# Patient Record
Sex: FEMALE | Race: ASIAN | Employment: OTHER | URBAN - METROPOLITAN AREA
[De-identification: names, ages, dates, MRNs, and addresses within clinical notes are randomized per-mention and may not be internally consistent; named-entity substitution may affect disease eponyms.]

---

## 2018-12-18 NOTE — PROGRESS NOTES
Annual Wellness Visit for 65 years and older    HPI  This 68 year old female presents as a new patient  Carole Weaver who presents for an annual wellness visit.    Other issues patient wants to be addressed today:  Chief Complaint   Patient presents with     Wellness Visit     Medication Reconciliation     completed, pt not taking anything      Patient Active Problem List   Diagnosis     Essential hypertension, benign     Mild major depression (H)     History reviewed. No pertinent past medical history.    History reviewed. No pertinent family history.  Problem List, Family History and past Medical History reviewed and  unchanged/updated.    Nursing Notes:   India Wise RN  12/20/2018  3:43 PM  Signed  Medicare Wellness Visit  Health Risk Assessment           Health Risk Assessment / Review of Systems     Constitutional: Any fevers or night sweats? No     Eyes:  Vision problems   No- last eye exam one month ago, received new eye glasses.    Hearing Do you feel you have hearing loss?  No     Cardiovascular: Any chest pain, fast or irregular heart beat, calf pain with walking?     No           Respiratory:   Any breathing problems or cough?   No- about 2 days ago has developed rhinorrhea. Otherwise no voiced concerns.    Gastrointestinal: Any stomach or stool problems?   No      Genitourinary: Do you have difficulty controlling urination?   No a problem. Would like to report she does wake up once a night to urinate.    Muscles and Joints: Any joint stiffness or soreness?   No     Skin: Any concerning lesions or moles?   No     Nervous System: Any loss of strength or feeling, numbness or tingling, shaking, dizziness, or headache?  No     Mental Health: Any depression, anxiety or problems sleeping?    No     Cognition: Do you have any problems with your memory?  No            Medical Care     What other specialists or organizations are involved in your medical care?  NONE  Patient Care Team       Relationship  Specialty Notifications Start End    Carole Weaver DO PCP - General   12/14/18     Phone: [phone] Fax: [phone]         [facility] [place] [address]          Have you been to the ER or overnight in the hospital in the last year?  No          Social History / Home Safety       Marital Status:-  passed away in 1988  Who lives in your household? Lives with her son and daughter in law, 4 grandchildren and 2 sons.    Do you feel threatened or controlled by a partner, ex-partner or anyone in your life? No     Has anyone hurt you physically, for example by pushing, hitting, slapping or kicking you   or forcing you to have sex? No          Does your home have any of the following safety concerns; loose rugs in the hallway,  bathrooms with no grab bars by the tub or toilet, stairs with no handrails or poorly lit areas?   YES - no grab bars in bathroom but not necessary and of concern currently.    Do you need help with dressing yourself, bathing, eating or getting around your home?  No     Do you need help with the phone, transportation, shopping, preparing meals, housework, laundry, medications or managing money?No       Risk Behaviors and Healthy Habits     History   Smoking Status     Never Smoker   Smokeless Tobacco     Not on file     How many servings of fruits and vegetables do you eat a day? 4-5 servings    Exercise: walking on treadmill 7 times a week x 1 hour.     Do you frequently drive without a seatbelt? No     Do you use tobacco?  No     Do you use any other drugs? No         Do you use alcohol?No      Frailty Assessment            Have you lost 10 or more pounds unintentionally in the previous year? No     How difficult is walking from one room to the other on the same level? not     Is it difficult to lift or carry something as heavy as 10 pounds? not      Compared with most (men/women) your age, would you say  that you are more active, less active, or about the same?  "more        FALL RISK ASSESSMENT 12/20/2018   Fallen 2 or more times in the past year? No   Any fall with injury in the past year? No     Timed up and go test- 8 seconds    Advance Directives:   Discussed with patient and family as appropriate. Has patient  completed advance directives and/or a living will? No- blank copy given to patient to bring home and complete with her children.    India Wise RN    [facility], [place], [place]  12/20/2018  4:54 PM  Signed  Due to patient being non-English speaking/uses sign language, an  was used for this visit. Only for face-to-face interpretation by an external agency, date and length of interpretation can be found on the scanned worksheet.     name: jorge law  Agency: [employer]  Language: Tricia   Telephone number: [phone]  Type of interpretation: Face-to-face, spoken        FOR WOMEN  What year did you stop having periods? 2003  Any vaginal bleeding in the last year? No  Have you ever had an abnormal Pap smear? No    EVALUATION OF COGNITIVE FUNCTION  Mood/affect:Normal  Appearance:Normal  Family member/caregiver input: N/A    PHQ-2 Score:   PHQ2 score 12/20/18 = 0  PHQ-2 ( 1999 Pfizer) 2/11/2014 7/18/2013   Q1: Little interest or pleasure in doing things 3 0   Q2: Feeling down, depressed or hopeless 3 0   PHQ-2 Score 6 0     PHQ-9 Score:   No flowsheet data found.    Advance Directives: Discussed with patient and family as appropriate. Has patient  completed advance directives and/or a living will? No, given  Immunization History   Administered Date(s) Administered     Influenza (High Dose) 3 valent vaccine 12/20/2018     Influenza (IIV3) PF 09/24/2012     Influenza Vaccine, 3 YRS +, IM (QUADRIVALENT W/PRESERVATIVES) 02/11/2014     Pneumo Conj 13-V (2010&after) 12/20/2018     TDAP Vaccine (Boostrix) 02/11/2014     Reviewed Immunization Record Today  Physical Exam    Vitals: BP (!) 184/98   Pulse 74   Temp 98.3  F (36.8  C) (Oral)   Ht 1.448 m (4' 9\")   " Wt 81.2 kg (179 lb)   SpO2 97%   BMI 38.74 kg/m    BMI= Body mass index is 38.74 kg/m .  EXAM:  GENERAL: healthy, alert and in no distress  EYES: Eyes grossly normal to inspection, extraocular movements intact, PERRL  HENT: ear canals clear with pearly grey TMs without bulging or erythema, moist mucous membranes with good dentition, no ulcers or lesions noted in oropharynx  NECK: no cervical adenopathy or other masses, thyroid without enlargement or nodules  RESP: lungs clear to auscultation - no wheezing or crackles, good air movement throughout  CV: regular rates and rhythm, normal S1 and S2 without murmur  ABDOMEN: soft, non-tender, no hepatosplenomegaly, normal active bowel sounds  MS: no gross extremity deformities noted, no peripheral edema  SKIN: no suspicious lesions or rashes  NEURO: moving all extremities equally and without difficulty, grossly normal sensory exam, intact mentation, clear coherent speech, symmetric reflexes   PSYCH: Alert and oriented times 3, able to articulate logical thoughts, no tangential thoughts, no hallucinations or delusions, appropriate affect    The 10-year ASCVD risk score (Vesna RAFAEL Jr., et al., 2013) is: 15.3%    Values used to calculate the score:      Age: 68 years      Sex: Female      Is Non- : No      Diabetic: No      Tobacco smoker: No      Systolic Blood Pressure: 184 mmHg      Is BP treated: No      HDL Cholesterol: 53 mg/dL      Total Cholesterol: 205 mg/dL    Assessment and Plan:  1. Medicare annual wellness visit, initial  Blood work significant for elevated LDL (135), calculated ASCVD risk 15.3%. Discuss initiation of statin and aspirin at follow up visit.     Lipids Screening- accepts  Mammogram- declines  Colon Cancer screening or offer iFOB Test- declines  Hep C screen- accepts  Zoster- encouraged to receive at local pharmacy  PCV13- accepted  Flu- accepts  DEXA- declines    - CBC with Plt ([facility])  - Basic Metabolic Panel ([facility])  -  Results < 1 hr  - Lipid Panel ([facility])  - Hepatitis C Antibody ([facility])  - Hemoglobin A1c ([facility])  - ADMIN VACCINE, INITIAL  - ADMIN VACCINE, EACH ADDITIONAL    2. Essential hypertension, benign  BP uncontrolled today. Hasn't been on anti-hypertensives since 2014, went off of them on her own. She is asymptomatic. Patient not interested in treatment despite discussion of risks of MI/CVA. Plan to follow up for BP after her trip to [place].     3. Mild major depression (H)  Mood stable, PHQ2 score =0 today. Not on mood stabilizing medication.     4. Immunization due  Flu and PCV given.   - ADMIN VACCINE, INITIAL  - ADMIN VACCINE, EACH ADDITIONAL      F/up 4 weeks to discuss initiation of BP med (if BP remains elevated), starting aspirin and statin.     Options for treatment and follow-up care were reviewed with the Hemal Becerra  and/or guardian engaged in the decision making process and verbalized  understanding of the options discussed and agreed with the final plan.    Carole Weaver, DO

## 2018-12-20 ENCOUNTER — OFFICE VISIT (OUTPATIENT)
Dept: FAMILY MEDICINE | Facility: CLINIC | Age: 68
End: 2018-12-20
Payer: COMMERCIAL

## 2018-12-20 VITALS
DIASTOLIC BLOOD PRESSURE: 98 MMHG | HEIGHT: 57 IN | HEART RATE: 74 BPM | OXYGEN SATURATION: 97 % | SYSTOLIC BLOOD PRESSURE: 184 MMHG | TEMPERATURE: 98.3 F | WEIGHT: 179 LBS | BODY MASS INDEX: 38.62 KG/M2

## 2018-12-20 DIAGNOSIS — F32.0 MILD MAJOR DEPRESSION (H): ICD-10-CM

## 2018-12-20 DIAGNOSIS — I10 ESSENTIAL HYPERTENSION, BENIGN: ICD-10-CM

## 2018-12-20 DIAGNOSIS — Z00.00 WELLNESS EXAMINATION: Primary | ICD-10-CM

## 2018-12-20 DIAGNOSIS — Z00.00 MEDICARE ANNUAL WELLNESS VISIT, INITIAL: Primary | ICD-10-CM

## 2018-12-20 DIAGNOSIS — Z23 IMMUNIZATION DUE: ICD-10-CM

## 2018-12-20 LAB
BUN SERPL-MCNC: 12 MG/DL (ref 7–30)
CALCIUM SERPL-MCNC: 9.9 MG/DL (ref 8.5–10.4)
CHLORIDE SERPLBLD-SCNC: 105 MMOL/L (ref 94–109)
CHOLEST SERPL-MCNC: 205 MG/DL
CHOLEST/HDLC SERPL: 3.9 RATIO
CO2 SERPL-SCNC: 34 MMOL/L (ref 20–32)
CREAT SERPL-MCNC: 0.6 MG/DL (ref 0.6–1.3)
EGFR CALCULATED (BLACK REFERENCE): >90 ML/MIN
EGFR CALCULATED (NON BLACK REFERENCE): >90 ML/MIN
GLUCOSE SERPL-MCNC: 113 MG/DL (ref 60–109)
HBA1C MFR BLD: 5.4 % (ref 4.1–5.7)
HCT VFR BLD AUTO: 43 % (ref 35–47)
HDLC SERPL-MCNC: 53 MG/DL
HEMOGLOBIN: 13.2 G/DL (ref 11.7–15.7)
LDLC SERPL CALC-MCNC: 135 MG/DL (ref 0–99)
MCH RBC QN AUTO: 32.3 PG (ref 26.5–35)
MCHC RBC AUTO-ENTMCNC: 30.7 G/DL (ref 32–36)
MCV RBC AUTO: 105.1 FL (ref 78–100)
PLATELET # BLD AUTO: 235 K/UL (ref 150–450)
POTASSIUM SERPL-SCNC: 3.9 MMOL/L (ref 3.4–5.3)
RBC # BLD AUTO: 4.09 M/UL (ref 3.8–5.2)
SODIUM SERPL-SCNC: 141 MMOL/L (ref 133–144)
TRIGL SERPL-MCNC: 89 MG/DL
VLDL-CHOLESTEROL: 18 MG/DL (ref 7–32)
WBC # BLD AUTO: 5 K/UL (ref 4–11)

## 2018-12-20 ASSESSMENT — MIFFLIN-ST. JEOR: SCORE: 1215.82

## 2018-12-20 NOTE — PATIENT INSTRUCTIONS
**GET ZOSTER VACCINE**    PERSONAL PREVENTIVE SERVICES PLAN - SERVICES     Review these tests with your medical staff then decide which ones you want and take this page home for your reference      SCREENING TESTS     Description   Year of Last Screening   Recommended Today?   Heart disease screening blood tests    Cholesterol level Reducing cholesterol can reduce your risk of heart attacks by 25%.  Screening is recommended yearly if you are at risk of heart disease otherwise every 4-5 years  Yes; Recommended    Diabetes screening tests    Hemoglobin A1c blood test   Finding and treating diabetes early can reduce complications.  Screening recommended/covered yearly if you have high blood pressure, high cholesterol, obesity (BMI >30), or a history of high blood glucose tests; or 2 of the following: family history of diabetes, overweight (BMI >25 but <30), age 65 years or older, and a history of diabetes of pregnancy or gave birth to baby weighing more than 9 lbs. 5/23/2013 Yes; Recommended    Hepatitis B screening Finding hepatitis B early can reduce complications.  Screening is recommended for persons with selected risk factors.  No: is not indicated today.   Hepatitis C screening Finding hepatitis C early can reduce complications.  Screening is recommended for all persons born from 1945 through 1965 and for those with selected other risk factors.   Yes; Recommended    HIV screening Finding HIV early can reduce complications.  Screening is recommended for persons with risk factors for HIV infection.  No: is not indicated today.   Glaucoma screening Early detection of glaucoma can prevent blindness.   Please talk to your eye doctor about this.       SCREENING TESTS     Description   Year of Last Screening   Recommended Today?   Colorectal cancer screening    Fecal occult blood test     Screening colonoscopy Screening for colon cancer has been shown to reduce death from colon cancer by 25-30%. Screening recommended to  start at 50 years and continuing until age 75 years.    Recommeded and patient declined.    Breast Cancer Screening (women)    Mammogram Mammogram screening for breast cancer has been shown to reduce the risk of dying from breast cancer and prolong life. Screening is recommended every 1-2 years for women aged 50 to 74 years.  2006 Recommeded and patient declined.    Cervical Cancer screening (women)    Pap Cervical pap smears can reduce cervical cancer. Screening is recommended annually if high risk (history of abnormal pap smears) otherwise every 2-3 years, stop screening at 65 years of age if history of normal paps.  No, not indicated for today   Screening for Osteoporosis:  Bone mass measurements (women)    Dexa Scan Screening and treating Osteoporosis can reduce the risk of hip and spine fractures. Screening is recommended in women 65 years or older and in women and men at risk of osteoporosis.  Recommeded and patient declined.    Screening for Lung Cancer     Low-dose CT scanning Screening can reduce mortality in persons aged 55-80 who have smoked at least 30 pack-years and who are either still smoking or have quit in the past 15 years.  No: is not indicated today.   Abdominal Aortic Aneurysm (AAA) screening    Ultrasound (US)   An aneurysm treated before rupture is very safe -a ruptured aneurysm can be fatal.  Screening  by US for AAA is limited to patients who meet one of the following criteria:    Men who are 65-75 years old and have smoked more than 100 cigarettes in their lifetime    Anyone with a family history of abdominal aortic aneurysm  No: is not indicated today.     Here are your recommended immunizations.  Take this home for your reference.                                                    IMMUNIZATIONS Description Recommend today?     Influenza (Flu shot) Prevents flu; should get every year Yes; Recommended and ordered.   PCV 13 Pneumonia vaccination; you get it once Yes; Recommended    PPSV 23  Second pneumonia vaccination; usually get it 1 year after PCV 13 No: is not indicated today.   Zoster (Shingles) Prevents shingles; you get it once  (Check with Part D insurance for coverage, must receive at a pharmacy, not clinic) Yes; Recommended    Tetanus Prevents tetanus; once every 10 years No; is up to date.     Hepatitis B  If you have any of the following risk factors you should be immunized for hepatitis B: severe kidney disease, people who live in the same house as a carrier of Hepatitis B virus, people who live in  institutions (e.g. nursing homes or group homes), homosexual men, patients with hemophilia who received Factor VIII or IX concentrates, abusers of illicit injectable drugs No: is not indicated today.      PATIENT INSTRUCTIONS    Yearly exam:     See your health care provider every year in order to review changes in your health, review medicines that you take, and discuss preventive care needs such as immunizations and cancer screening.    Get a flu shot each year.     Advance Directives:    If you have not done so, you are encouraged to complete advance directives and/or a living will.   More information about advance directives can be found at: http://www.[place]med.org/advocacy/Key-Issues/Advance-Directives    Nutrition:     Eat at least 5 servings of fruits and vegetables each day.     Eat whole-grain bread, whole-wheat pasta and brown rice instead of white grains and rice.     Talk to your doctor about Calcium and Vitamin D.     Lifestyle:    Exercise for at least 150 minutes a week (30 minutes a day, 5 days a week). This will help you control your weight and prevent disease.     Limit alcohol to one drink per day.     If you smoke, try to quit - your doctor will be happy to help.     Wear sunscreen to prevent skin cancer.     See your dentist every six months for an exam and cleaning.     See your eye doctor every 1 to 2 years to screen for conditions such as glaucoma, macular degeneration and  cataracts.                            Preventive Health Recommendations    See your health care provider every year to    Review health changes.     Discuss preventive care.      Review your medicines if your doctor has prescribed any.      You no longer need a yearly Pap test unless you've had an abnormal Pap test in the past 10 years. If you have vaginal symptoms, such as bleeding or discharge, be sure to talk with your provider about a Pap test.      Every 1 to 2 years, have a mammogram.  If you are over 69, talk with your health care provider about whether or not you want to continue having screening mammograms.      Every 10 years, have a colonoscopy. Or, have a yearly FIT test (stool test). These exams will check for colon cancer.       Have a cholesterol test every 5 years, or more often if your doctor advises it.       Have a diabetes test (fasting glucose) every three years. If you are at risk for diabetes, you should have this test more often.       At age 65, have a bone density scan (DEXA) to check for osteoporosis (brittle bone disease).    Shots:    Get a flu shot each year.    Get a tetanus shot every 10 years.    Talk to your doctor about your pneumonia vaccines. There are now two you should receive - Pneumovax (PPSV 23) and Prevnar (PCV 13).    Talk to your pharmacist about the shingles vaccine.    Talk to your doctor about the hepatitis B vaccine.    Nutrition:     Eat at least 5 servings of fruits and vegetables each day.      Eat whole-grain bread, whole-wheat pasta and brown rice instead of white grains and rice.      Get adequate Calcium and Vitamin D.     Lifestyle    Exercise at least 150 minutes a week (30 minutes a day, 5 days a week). This will help you control your weight and prevent disease.      Limit alcohol to one drink per day.      No smoking.       Wear sunscreen to prevent skin cancer.       See your dentist twice a year for an exam and cleaning.      See your eye doctor every 1  to 2 years to screen for conditions such as glaucoma, macular degeneration and cataracts.    Personalized Prevention Plan  You are due for the preventive services outlined below.  Your care team is available to assist you in scheduling these services.  If you have already completed any of these items, please share that information with your care team to update in your medical record.  Health Maintenance Due   Topic Date Due     Depression Action Plan Review  06/15/1968     Hepatitis C Screening  06/15/1968     Mammogram - every 2 years  06/15/1990     Cholesterol Lab - every 5 years  06/15/1995     Colon Cancer Screening - every 10 years.  06/15/2000     Zoster (Chicken Pox) Vaccine (1 of 2) 06/15/2000     Discuss Advance Directive Planning  06/15/2005     Depression Assessment - every 6 months  08/11/2014     FALL RISK ASSESSMENT  06/15/2015     Bone Density Screening (Dexa)  06/15/2015     Pneumococcal Vaccine (1 of 2 - PCV13) 06/15/2015     Flu Vaccine (1) 09/01/2018

## 2018-12-20 NOTE — NURSING NOTE
Medicare Wellness Visit  Health Risk Assessment           Health Risk Assessment / Review of Systems     Constitutional: Any fevers or night sweats? No     Eyes:  Vision problems   No- last eye exam one month ago, received new eye glasses.    Hearing Do you feel you have hearing loss?  No     Cardiovascular: Any chest pain, fast or irregular heart beat, calf pain with walking?     No           Respiratory:   Any breathing problems or cough?   No- about 2 days ago has developed rhinorrhea. Otherwise no voiced concerns.    Gastrointestinal: Any stomach or stool problems?   No      Genitourinary: Do you have difficulty controlling urination?   No a problem. Would like to report she does wake up once a night to urinate.    Muscles and Joints: Any joint stiffness or soreness?   No     Skin: Any concerning lesions or moles?   No     Nervous System: Any loss of strength or feeling, numbness or tingling, shaking, dizziness, or headache?  No     Mental Health: Any depression, anxiety or problems sleeping?    No     Cognition: Do you have any problems with your memory?  No            Medical Care     What other specialists or organizations are involved in your medical care?  NONE  Patient Care Team       Relationship Specialty Notifications Start Carole Rangel DO PCP - General   12/14/18     Phone: [phone] Fax: [phone]         [facility] [place] [address]          Have you been to the ER or overnight in the hospital in the last year?  No          Social History / Home Safety       Marital Status:-  passed away in 1988  Who lives in your household? Lives with her son and daughter in law, 4 grandchildren and 2 sons.    Do you feel threatened or controlled by a partner, ex-partner or anyone in your life? No     Has anyone hurt you physically, for example by pushing, hitting, slapping or kicking you   or forcing you to have sex? No          Does your home have any of the following  safety concerns; loose rugs in the hallway,  bathrooms with no grab bars by the tub or toilet, stairs with no handrails or poorly lit areas?   YES - no grab bars in bathroom but not necessary and of concern currently.    Do you need help with dressing yourself, bathing, eating or getting around your home?  No     Do you need help with the phone, transportation, shopping, preparing meals, housework, laundry, medications or managing money?No       Risk Behaviors and Healthy Habits     History   Smoking Status     Never Smoker   Smokeless Tobacco     Not on file     How many servings of fruits and vegetables do you eat a day? 4-5 servings    Exercise: walking on treadmill 7 times a week x 1 hour.     Do you frequently drive without a seatbelt? No     Do you use tobacco?  No     Do you use any other drugs? No         Do you use alcohol?No      Frailty Assessment            Have you lost 10 or more pounds unintentionally in the previous year? No     How difficult is walking from one room to the other on the same level? not     Is it difficult to lift or carry something as heavy as 10 pounds? not      Compared with most (men/women) your age, would you say  that you are more active, less active, or about the same? more        FALL RISK ASSESSMENT 12/20/2018   Fallen 2 or more times in the past year? No   Any fall with injury in the past year? No     Timed up and go test- 8 seconds    Advance Directives:   Discussed with patient and family as appropriate. Has patient  completed advance directives and/or a living will? No- blank copy given to patient to bring home and complete with her children.    India Wise RN

## 2018-12-20 NOTE — NURSING NOTE
Due to patient being non-English speaking/uses sign language, an  was used for this visit. Only for face-to-face interpretation by an external agency, date and length of interpretation can be found on the scanned worksheet.     name: jorge law  Agency: [employer]  Language: Facundoong   Telephone number: [phone]  Type of interpretation: Face-to-face, spoken

## 2018-12-20 NOTE — LETTER
December 24, 2018      Hemal Becerra  [address] [address]        Dear Ma,  Your labs showed that your cholesterol and blood glucose was elevated. You should continue to work on diet and exercise to help normalize these levels. Please also schedule an appointment after your trip to [place] so we can discuss starting two medicines (aspirin and statin) to help protect you from heart attacks and strokes. Your hepatitis C screen was negative. Thank you    Please see below for your test results.    Resulted Orders   CBC with Plt ([facility])   Result Value Ref Range    WBC 5.0 4.0 - 11.0 K/uL    RBC 4.09 3.80 - 5.20 M/uL    Hemoglobin 13.2 11.7 - 15.7 g/dL    Hematocrit 43.0 35.0 - 47.0 %    .1 (H) 78.0 - 100.0 fL    MCH 32.3 26.5 - 35.0 pg    MCHC 30.7 (L) 32.0 - 36.0 g/dL    Platelets 235.0 150.0 - 450.0 K/uL   Basic Metabolic Panel ([facility])  - Results < 1 hr   Result Value Ref Range    Glucose 113.0 (H) 60.0 - 109.0 mg/dL    Urea Nitrogen 12.0 7.0 - 30.0 mg/dL    Creatinine 0.6 0.6 - 1.3 mg/dL    Sodium 141.0 133.0 - 144.0 mmol/L    Potassium 3.9 3.4 - 5.3 mmol/L    Chloride 105.0 94.0 - 109.0 mmol/L    Carbon Dioxide 34.0 (H) 20.0 - 32.0 mmol/L    Calcium 9.9 8.5 - 10.4 mg/dL    eGFR Calculated (Non Black Reference) >90 >60.0 mL/min    eGFR Calculated (Black Reference) >90 >60.0 mL/min   Lipid Panel ([facility])   Result Value Ref Range    Cholesterol 205.0 (H) <200.0 mg/dL    Triglycerides 89.0 <150.0 mg/dL    HDL Cholesterol 53.0 >50.0 mg/dL      Comment:      If diabetic or CVD then reference range <100    VLDL-Cholesterol 18.0 7.0 - 32.0 mg/dL    LDL Cholesterol Direct 135.0 (H) 0.0 - 99.0 mg/dL    Cholesterol/HDL Ratio 3.9 <5.0 RATIO   Hepatitis C Antibody ([facility])   Result Value Ref Range    Hepatitis C Antibody Screen Negative Negative    Narrative    Test performed by:  [facility]  [address] [place] [address]   Hemoglobin A1c ([facility])   Result Value Ref Range     Hemoglobin A1C 5.4 4.1 - 5.7 %       If you have any questions, please call the clinic to make an appointment.    Sincerely,    Carole Weaver, DO

## 2018-12-21 LAB — HCV AB SER QL: NEGATIVE

## 2018-12-21 NOTE — RESULT ENCOUNTER NOTE
Please notify patient:    Your labs showed that your cholesterol and blood glucose was elevated. You should continue to work on diet and exercise to help normalize these levels. Please also schedule an appointment after your trip to [place] so we can discuss starting two medicines (aspirin and statin) to help protect you from heart attacks and strokes. Your hepatitis C screen was negative.     Carole Weaver DO  [facility] Medicine Resident  Pager #[phone]

## 2018-12-26 NOTE — PROGRESS NOTES
Preceptor Attestation:   Patient seen, evaluated and discussed with the resident. I have verified the content of the note, which accurately reflects my assessment of the patient and the plan of care.  Supervising Physician:Kimberley Alcaraz DO [facility]

## 2021-03-10 ENCOUNTER — IMMUNIZATION (OUTPATIENT)
Dept: NURSING | Facility: CLINIC | Age: 71
End: 2021-03-10
Payer: MEDICARE

## 2021-03-10 PROCEDURE — 0031A PR COVID VAC JANSSEN AD26 0.5ML: CPT

## 2021-03-10 PROCEDURE — 91303 PR COVID VAC JANSSEN AD26 0.5ML: CPT

## 2025-05-01 ENCOUNTER — OFFICE VISIT (OUTPATIENT)
Dept: FAMILY MEDICINE | Facility: CLINIC | Age: 75
End: 2025-05-01
Payer: COMMERCIAL

## 2025-05-01 VITALS
RESPIRATION RATE: 14 BRPM | DIASTOLIC BLOOD PRESSURE: 94 MMHG | SYSTOLIC BLOOD PRESSURE: 177 MMHG | HEART RATE: 88 BPM | TEMPERATURE: 98.3 F | BODY MASS INDEX: 45.52 KG/M2 | WEIGHT: 211 LBS | HEIGHT: 57 IN | OXYGEN SATURATION: 97 %

## 2025-05-01 DIAGNOSIS — I10 ESSENTIAL HYPERTENSION, BENIGN: ICD-10-CM

## 2025-05-01 DIAGNOSIS — Z12.11 SCREEN FOR COLON CANCER: ICD-10-CM

## 2025-05-01 DIAGNOSIS — Z00.00 ENCOUNTER FOR MEDICARE ANNUAL WELLNESS EXAM: Primary | ICD-10-CM

## 2025-05-01 DIAGNOSIS — I10 SEVERE ESSENTIAL HYPERTENSION: ICD-10-CM

## 2025-05-01 DIAGNOSIS — Z13.6 SCREENING FOR CARDIOVASCULAR CONDITION: ICD-10-CM

## 2025-05-01 DIAGNOSIS — Z12.31 VISIT FOR SCREENING MAMMOGRAM: ICD-10-CM

## 2025-05-01 DIAGNOSIS — Z13.1 SCREENING FOR DIABETES MELLITUS: ICD-10-CM

## 2025-05-01 LAB
EST. AVERAGE GLUCOSE BLD GHB EST-MCNC: 120 MG/DL
HBA1C MFR BLD: 5.8 % (ref 0–5.6)

## 2025-05-01 RX ORDER — LISINOPRIL AND HYDROCHLOROTHIAZIDE 10; 12.5 MG/1; MG/1
1 TABLET ORAL DAILY
Qty: 90 TABLET | Refills: 0 | Status: SHIPPED | OUTPATIENT
Start: 2025-05-01 | End: 2025-05-07

## 2025-05-01 SDOH — HEALTH STABILITY: PHYSICAL HEALTH: ON AVERAGE, HOW MANY DAYS PER WEEK DO YOU ENGAGE IN MODERATE TO STRENUOUS EXERCISE (LIKE A BRISK WALK)?: 1 DAY

## 2025-05-01 SDOH — HEALTH STABILITY: PHYSICAL HEALTH: ON AVERAGE, HOW MANY MINUTES DO YOU ENGAGE IN EXERCISE AT THIS LEVEL?: 30 MIN

## 2025-05-01 ASSESSMENT — SOCIAL DETERMINANTS OF HEALTH (SDOH): HOW OFTEN DO YOU GET TOGETHER WITH FRIENDS OR RELATIVES?: PATIENT DECLINED

## 2025-05-01 NOTE — PROGRESS NOTES
Faculty Supervision of Residents   I have examined this patient and the medical care has been evaluated and discussed with the resident. See resident note outlining our discussion.    Ana Pike MD

## 2025-05-01 NOTE — PROGRESS NOTES
"Preventive Care Visit  [facility]  Vinnie Sid Herndon MD, Family Medicine  May 1, 2025      Assessment & Plan     Encounter for Medicare annual wellness exam  Ma is a 74 year old Hmong speaking female here for preventative care visit.  She is not currently on any medications and strongly desires to not be on any.  We did talk about the risks of continuing with these very high blood pressures and she was ok with restarting a blood pressure medication.    Severe essential hypertension  Essential hypertension, benign  Patient presents to clinic today with severe asymptomatic hypertension.  Has been on ACE-I/hydrochlorothiazide combo pill in the past and tolerated it well.  Will restart this today.  Discussed the importance of close follow-up.  Discussed ED precautions with patient and her son and highlighted these in the AVS.   - BASIC METABOLIC PANEL  - lisinopril-hydrochlorothiazide (ZESTORETIC) 10-12.5 MG tablet  Dispense: 90 tablet; Refill: 0  - Follow-up in 1 week for BP check, and 2 weeks for BP check and BMP    Screening for diabetes mellitus  - Glucose  - Hemoglobin A1c    Screening for cardiovascular condition  - Lipid panel reflex to direct LDL Non-fasting    Visit for screening mammogram  Declined by patient    Screen for colon cancer  Declined by patient    DEXA  Declined by patient    Recommended shingles and pneumococcal vaccines to be done at pharmacy.  Will do TDaP today.    Follow-up in 1 week        BMI  Estimated body mass index is 46.16 kg/m  as calculated from the following:    Height as of this encounter: 1.44 m (4' 8.69\").    Weight as of this encounter: 95.7 kg (211 lb).   Weight management plan: Patient was referred to their PCP to discuss a diet and exercise plan.    Counseling  Appropriate preventive services were addressed with this patient via screening, questionnaire, or discussion as appropriate for fall prevention, nutrition, physical activity, Tobacco-use " cessation, social engagement, weight loss and cognition.  Checklist reviewing preventive services available has been given to the patient.  Reviewed patient's diet, addressing concerns and/or questions.   The patient was instructed to see the dentist every 6 months.         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 Yes   Language Tricia   Type of interpretation provided Face-to-face    name Amelia Morgan    Agency [employer]             Advance Care Planning    Health Care Directive received at today's visit.  Forwarded to [facility].        5/1/2025   General Health   How would you rate your overall physical health? Good   Feel stress (tense, anxious, or unable to sleep) Not at all         5/1/2025   Nutrition   Diet: Regular (no restrictions)         5/1/2025   Exercise   Days per week of moderate/strenous exercise 1 day   Average minutes spent exercising at this level 30 min   (!) EXERCISE CONCERN      5/1/2025   Social Factors   Frequency of gathering with friends or relatives Patient declined   Worry food won't last until get money to buy more No   Food not last or not have enough money for food? No   Do you have housing? (Housing is defined as stable permanent housing and does not include staying outside in a car, in a tent, in an abandoned building, in an overnight shelter, or couch-surfing.) Yes   Are you worried about losing your housing? No    Lack of transportation? No   Unable to get utilities (heat,electricity)? No         5/1/2025   Fall Risk   Fallen 2 or more times in the past year? No   Trouble with walking or balance? No          5/1/2025   Activities of Daily Living- Home Safety   Needs help with the following daily activites None of the above   Safety concerns in the home None of the above         5/1/2025   Dental   Dentist two times every year? (!) NO         5/1/2025   Hearing Screening   Hearing concerns? None of the above         5/1/2025   Driving Risk Screening   Patient/family members have concerns about driving No         5/1/2025   General Alertness/Fatigue Screening   Have you been more tired than usual lately? No         5/1/2025   Urinary Incontinence Screening   Bothered by leaking urine in past 6 months No         Today's PHQ-2 Score:       5/1/2025    10:28 AM   PHQ-2 ( 1999 Pfizer)   Q1: Little interest or pleasure in doing things 0   Q2: Feeling down, depressed or hopeless 0   PHQ-2 Score 0    Q1: Little interest or pleasure in doing things Not at all   Q2: Feeling down, depressed or hopeless Not at all   PHQ-2 Score 0       Patient-reported           5/1/2025   Substance Use   Alcohol more than 3/day or more than 7/wk No   Do you have a current opioid prescription? No   How severe/bad is pain from 1 to 10? 0/10 (No Pain)   Do you use any other substances recreationally? No     Social History     Tobacco Use    Smoking status: Never    Smokeless tobacco: Never   Substance Use Topics    Alcohol use: No    Drug use: No            ASCVD Risk   The ASCVD Risk score (Hien DK, et al., 2019) failed to calculate for the following reasons:    Cannot find a previous HDL lab    Cannot find a previous total cholesterol lab            Reviewed and updated as needed this visit by Provider                    No past medical history on file.  No past surgical history on file.  OB History   No obstetric history on file.     BP  Readings from Last 3 Encounters:   05/01/25 (!) 177/94   12/20/18 (!) 184/98   02/11/14 99/67    Wt Readings from Last 3 Encounters:   05/01/25 95.7 kg (211 lb)   12/20/18 81.2 kg (179 lb)   02/11/14 76.6 kg (168 lb 12.8 oz)                  Patient Active Problem List   Diagnosis    Essential hypertension, benign    Mild major depression (H)     No past surgical history on file.    Social History     Tobacco Use    Smoking status: Never    Smokeless tobacco: Never   Substance Use Topics    Alcohol use: No     No family history on file.      Current Outpatient Medications   Medication Sig Dispense Refill    lisinopril-hydrochlorothiazide (ZESTORETIC) 10-12.5 MG tablet Take 1 tablet by mouth daily. 90 tablet 0     Allergies   Allergen Reactions    Nkda [No Known Drug Allergy]      Current providers sharing in care for this patient include:  Patient Care Team:  Vinnie Herndon MD as PCP - General (Family Medicine)  Jess Pratt LSW as     The following health maintenance items are reviewed in Epic and correct as of today:  Health Maintenance   Topic Date Due    DEXA  Never done    ADVANCE CARE PLANNING  Never done    DEPRESSION ACTION PLAN  Never done    MAMMO SCREENING  Never done    COLORECTAL CANCER SCREENING  Never done    ZOSTER IMMUNIZATION (1 of 2) Never done    RSV VACCINE (1 - Risk 60-74 years 1-dose series) Never done    PHQ-9  08/11/2014    BMP  12/20/2019    Pneumococcal Vaccine: 50+ Years (2 of 2 - PPSV23) 12/20/2019    DIABETES SCREENING  12/20/2021    LIPID  12/20/2023    DTAP/TDAP/TD IMMUNIZATION (2 - Td or Tdap) 02/11/2024    COVID-19 Vaccine (2 - 2024-25 season) 09/01/2024    INFLUENZA VACCINE (Season Ended) 09/01/2025    MEDICARE ANNUAL WELLNESS VISIT  05/01/2026    FALL RISK ASSESSMENT  05/01/2026    HEPATITIS C SCREENING  Completed    HPV IMMUNIZATION  Aged Out    MENINGITIS IMMUNIZATION  Aged Out         Review of Systems  Constitutional, neuro, ENT, pulmonary, cardiac,  "gastrointestinal, genitourinary, musculoskeletal, are negative, except as otherwise noted.     Objective    Exam  BP (!) 177/94   Pulse 88   Temp 98.3  F (36.8  C) (Tympanic)   Resp 14   Ht 1.44 m (4' 8.69\")   Wt 95.7 kg (211 lb)   SpO2 97%   BMI 46.16 kg/m     Estimated body mass index is 46.16 kg/m  as calculated from the following:    Height as of this encounter: 1.44 m (4' 8.69\").    Weight as of this encounter: 95.7 kg (211 lb).    Physical Exam    GENERAL: alert and no distress  EYES: Eyes grossly normal to inspection, conjunctivae and sclerae normal  HENT:  mouth without ulcers or lesions  NECK: no adenopathy, no asymmetry, masses, or scars  RESP: lungs clear to auscultation - no rales, rhonchi or wheezes  CV: regular rate and rhythm, no murmur,  no peripheral edema  ABDOMEN: soft, nontender,  and bowel sounds normal  MS: no gross musculoskeletal defects noted, trace edema  NEURO: Normal strength and tone, mentation intact and speech normal  PSYCH: mentation appears normal, affect normal/bright        5/1/2025   Mini Cog   Clock Draw Score 0 Abnormal   3 Item Recall 0 objects recalled   Mini Cog Total Score 0              Signed Electronically by: Vinnie Herndon MD    "

## 2025-05-01 NOTE — PATIENT INSTRUCTIONS
"Please see a local pharmacy to recieve your Shingles vaccine and your Pneumococcal vaccine    Patient Education   Rosy Adan Xeeb Saib Xyuas Kom Tiv Thaiv Tus Kheej  Nov yog ib co rosy adan xeeb uas peb yeej meem muab david tib neeg pab lawv noj qab nyob zoo. Kerline zaum koj pab pawg saib xyuas yuav muaj ib co rosy adan xeeb uas tshwj xeeb david koj nkaus xwb. Thov nrog koj pab pawg saib xyuas sib ivan txog kerline yam uas koj toob lisa kom tiv thaiv koj tus kheej.  Sandra Coj Lub Neej  Es xaws xais ntau huy 150 feeb txhua lub cobb tiam (30 feeb txhua hnub, 5 hnub ib lub cobb tiam).  Ua yam pab cov leeg muaj zog tuaj 2 zaug txhua lub cobb tiam. Kerline yam no pab koj tswj hwm koj lub cev qhov hnyav thiab tiv thaiv ntawm kab mob.  Txhob haus luam yeeb.  Pleev tshuaj tiv thaiv tshav kub kom thiaj tsis raug mob khees xaws ntawm nqaij tawv.  Txhua 2 mus david 5 xyoos yuav tau kuaj koj lub tsev david qhov radon. Radon yog ib heath ginna uas tsis muaj xim tsis muaj ntxhiab uas mob tau koj cov ntsws. Kom thiaj kawm ntxiv, mus saib [email or web address] thiab ntaus ntawv \"Radon in Homes.\"  Ceev cov phom kom tsis muaj mos txwv david hauv thiab muab xauv marlen hauv ib qho chaw nyab xeeb xws li lub txhoj, los yog muab xauv marlen thiab muab cov yawm sij zais marlen. Muab cov mos txwv xauv david hauv lwm qhov chaw nrug cov phom. Kom thiaj kawm ntxiv, mus saib [email or web address] thiab ntaus ntawv \"safe gun storage.\"  Sandra Noj Qab Huv  Noj 5 qho txiv hmab txiv ntoo thiab zaub txhua hnub.  Noj nplem nplej, txhuv xim av thiab cov fawm muaj nplej (los theej nplem dawb, txhuv dawb, thiab fawm dawb).  Ua tib zoo noj calcium thiab vitamin D ntau. Saib cov ntawv lo david pob khoom noj thiab siv zog noj kom txog 100% RDA (qhov ntau hauv ib hnub).  Kalin dawsonem kuaj ntsuas  Txhua 6 lub hli mus kuaj hniav thiab muab tu.  Txhua xyoo mus saib koj pab pawg saib xyuas sandra noj qab nyob zoo kom sib ivan txog:  Ib yam dab tsi uas hloov ntawm koj txoj sandra noj qab nyob zoo.  Cov tshuaj uas koj pab pawg tau " hais kom siv.  Sandra saib xyuas kom tiv thaiv, sandra npaj david tsev neeg, thiab yuav ua li yesika tiv thaiv ntawm kab mob uas ntev.  Sandra txhaj tshuaj (koob tshuaj tiv thaiv)   Cov koob tshuaj HPV (txog thaum muaj 26 xyoo), yog koj yeej tsis tau txais huy li.  Cov koob tshuaj Hepatitis B Kab Mob Siab (txog thaum muaj 59 xyoos), yog koj yeej tsis tau txais huy li.  Koob tshuaj COVID-19: Txais koob tshuaj no thaum txog caij txais.  Koob tshuaj tiv thaiv ntawm mob khaub thuas: Txais txhua xyoo.  Koob tshuaj tiv thaiv mob daig tsaig: Txais txhua 10 xyoo.  Pneumococcal, hepatitis A, thiab RSV cov koob tshuaj: Nug koj pab pawg saib xyuas latesha puas tsim nyog david koj txais cov no raws li koj qhov pheej hmoo.  Koob tshuaj tiv thaiv ntawm kab mob sawv hlwv (david cov muaj 50 xyoo rov jewels).  Sandra kuaj ntsuas david sandra noj qab nyob zoo  Kuaj mob ntshav qab zib:  Pib thaum muaj 35 xyoos, Mus kuaj mob ntshav qab zib txhua 3 xyoos los yog ntau zog.  Yog koj tseem tsis tau txog 35 xyoos, nug koj pab pawg saib xyuas latesha puas tsim nyog advid koj kuaj mob ntshav qab zib.  Kuaj cholesterol: Thaum muaj 39 xyoo, pib kuaj cholesterol txhua 5 xyoos, los yog ntau huy ntawd yog kws tyra mob qhia.  Kuaj txha qhov tuab (DEXA): Thaum txog 50 xyoo lawd, nug koj pab pawg saib xyuas latesha puas tsim nyog david koj kuaj txha latesha puas ruaj.  Kab Mob Siab Hepatitis C: Kuaj ib zaug hauv koj lub neej.  Kuaj Txoj Hlab Ntshav Hauv Plab: Nrog koj tus kws tyra mob ivan txog sandra kuaj ntsuas no yog koj:  Tau haus luam yeeb ib zaug li; thiab  Yog txiv neej; thiab  Nruab hnub nyoog 65 thiab 75.  Mob Lisa Cees (kis mob dhau ntawm sandra sib deev)  Ua ntej muaj 24 xyoos: Nug koj pab pawg saib xyuas latesha puas tsim nyog kuaj david mob lisa cees.  Scar qab muaj 24 xyoos: Mus kuaj david mob lisa cees yog koj muaj sandra pheej hmoo. Koj muaj sandra pheej hmoo david mob lisa cees (suav nrog HIV) yog:  Koj sib deev nrog ntau huy ib tug neeg.  Koj tsis siv cov hnab looj qau thaum sib deev.  Koj los yog koj tus  khub deev kuaj pom tias muaj mob lisa cees lawm.  Yog koj muaj sandra pheej hmoo david mob lisa cees HIV, nug txog cov tshuaj PrEP kom tiv thaiv ntawm HIV.  Mus kuaj david mob lisa cees HIV yam ntau huy ib zaug hauv koj lub neej, txawm yog koj muaj sandra pheej hmoo david mob lisa cees HIV los tsis muaj los xij.  Kuaj david mob khees xaws  Kuaj lub ncauj tsev me nyuam david mob khees xaws: Yog koj muaj ib lub ncauj tsev me nyuam, manjeet yuav tau ib sij kuaj lub ncauj tsev me nyuam seb puas muaj mob khees xaws pib thaum muaj 21 xyoos. Neeg feem coob uas ib sij kuaj lub ncauj tsev me nyuam thiab tsis pom dab tsi txawv lawv tsum tau alma qab muaj 65 xyoos. Nrog koj tus kws tyra mob sib ivan txog qhov no.  Kuaj lub mis david mob khees xaws (mammogram): Yog koj tau muaj mis huy li, yuav tau ib sij kuaj lub mis pib thaum muaj 40 xyoo. Sandra kuaj no yog kom saib seb puas muaj mob khees xaws hauv lub mis.  Kuaj Txoj Hnyuv David Mob Khees Xaws: Yeej tseem ceeb pib kuaj txoj hnyuv david mob khees xaws pib thaum muaj 45 xyoos.  Txhua 10 xyoo yuav tau kuaj txoj hnyuv (los yog ntau zog yog koj muaj sandra pheej hmoo) Los sis, nug koj tus kws tyra mob txog sandra kuaj thooj quav FIT txhua xyoo los yog Cologuard txhua 3 xyoos.  Kom thiaj kawm ntxiv txog kerline heath kuaj no, mus saib: [email or web address].  Yog xav tau sandra pab txog qhov no, mus saib: [email or web address].  Kuaj lub roro kua phev (prostate) david mob khees xaws: Yog koj muaj ib lub roro kua phev (prostate) thiab nruab hnub nyoog 55 mus david 69, nug koj tus kws tyra mob latesha puas tsim nyog david koj kuaj lub roro kua phev.  Kuaj ntsws david mob khees xaws: Yog koj haus luam yeeb abraham sim no los yog tau haus yav tas los uas muaj hnub nyoog nruab 50 xyoos mus david 80 xyoo, nug koj pab pawg saib xyuas latesha puas tsim nyog david koj yeej meem kuaj lub ntsws david mob khees xaws.    David cov heath phiaj sandra siv ua ntaub ntawv qhia nkaus xwb. Yuav tsis pauv sandra qhia los ntawm koj qhov chaw tyra mob. Copyright (rhys cordon)   2023  "[facility].   [facility] txoj bravo raug tswj tseg [facility]. Tshaj xygregoria horn ntawm [facility]. ALTILIA [id number] - REV 04/24.  Eating Healthy Foods: Care Instructions  With every meal, you can make healthy food choices. Try to eat a variety of fruits, vegetables, whole grains, lean proteins, and low-fat dairy products. This can help you get the right balance of nutrients, including vitamins and minerals. Small changes add up over time. You can start by adding one healthy food to your meals each day.    Try to make half your plate fruits and vegetables, one-fourth whole grains, and one-fourth lean proteins. Try including dairy with your meals.   Eat more fruits and vegetables. Try to have them with most meals and snacks.   Foods for healthy eating        Fruits   These can be fresh, frozen, canned, or dried.  Try to choose whole fruit rather than fruit juice.  Eat a variety of colors.        Vegetables   These can be fresh, frozen, canned, or dried.  Beans, peas, and lentils count too.        Whole grains   Choose whole-grain breads, cereals, and noodles.  Try brown rice.        Lean proteins   These can include lean meat, poultry, fish, and eggs.  You can also have tofu, beans, peas, lentils, nuts, and seeds.        Dairy   Try milk, yogurt, and cheese.  Choose low-fat or fat-free when you can.  If you need to, use lactose-free milk or fortified plant-based milk products, such as soy milk.        Water   Drink water when you're thirsty.  Limit sugar-sweetened drinks, including soda, fruit drinks, and sports drinks.  Where can you learn more?  Go to https://www.[employer].net/patiented  Enter T756 in the search box to learn more about \"Eating Healthy Foods: Care Instructions.\"  Current as of: October 7, 2024  Content Version: 14.4 2024-2025 [facility] [employer].   Care instructions adapted under license by your healthcare professional. If you have questions about a medical " condition or this instruction, always ask your healthcare professional. [employer] disclaims any warranty or liability for your use of this information.    Body Mass Index: Care Instructions  Overview     Body mass index (BMI) can help you see if your weight is raising your risk for health problems. It uses a formula to compare how much you weigh with how tall you are.  A BMI lower than 18.5 is considered underweight.  A BMI between 18.5 and 24.9 is considered healthy.  A BMI between 25 and 29.9 is considered overweight. A BMI of 30 or higher is considered obese.  If your BMI is in the normal range, it means that you have a lower risk for weight-related health problems. If your BMI is in the overweight or obese range, you may be at increased risk for weight-related health problems, such as high blood pressure, heart disease, stroke, arthritis or joint pain, and diabetes. If your BMI is in the underweight range, you may be at increased risk for health problems such as fatigue, lower protection (immunity) against illness, muscle loss, bone loss, hair loss, and hormone problems.  BMI is just one measure of your risk for weight-related health problems. You may be at higher risk for health problems if you are not active, you eat an unhealthy diet, or you drink too much alcohol or use tobacco products.  Follow-up care is a key part of your treatment and safety. Be sure to make and go to all appointments, and call your doctor if you are having problems. It's also a good idea to know your test results and keep a list of the medicines you take.  How can you care for yourself at home?  Practice healthy eating habits. This includes eating plenty of fruits, vegetables, whole grains, lean protein, and low-fat dairy.  If your doctor recommends it, get more exercise. Walking is a good choice. Bit by bit, increase the amount you walk every day. Try for at least 30 minutes on most days of the week.  Do not smoke. Smoking  "can increase your risk for health problems. If you need help quitting, talk to your doctor about stop-smoking programs and medicines. These can increase your chances of quitting for good.  Limit alcohol to 2 drinks a day for men and 1 drink a day for women. Too much alcohol can cause health problems.  If you have a BMI higher than 25  Your doctor may do other tests to check your risk for weight-related health problems. This may include measuring the distance around your waist. A waist measurement of more than 40 inches in men or 35 inches in women can increase the risk of weight-related health problems.  Talk with your doctor about steps you can take to stay healthy or improve your health. You may need to make lifestyle changes to lose weight and stay healthy, such as changing your diet and getting regular exercise.  If you have a BMI lower than 18.5  Your doctor may do other tests to check your risk for health problems.  Talk with your doctor about steps you can take to stay healthy or improve your health. You may need to make lifestyle changes to gain or maintain weight and stay healthy, such as getting more healthy foods in your diet and doing exercises to build muscle.  Where can you learn more?  Go to https://www.[employer].net/patiented  Enter S176 in the search box to learn more about \"Body Mass Index: Care Instructions.\"  Current as of: April 30, 2024  Content Version: 14.4    9076-4646 [employer].   Care instructions adapted under license by your healthcare professional. If you have questions about a medical condition or this instruction, always ask your healthcare professional. [employer] disclaims any warranty or liability for your use of this information.       "

## 2025-05-01 NOTE — NURSING NOTE
Prior to immunization administration, verified patients identity using patient s name and date of birth. Please see Immunization Activity for additional information.     Screening Questionnaire for Adult Immunization    Are you sick today?   No   Do you have allergies to medications, food, a vaccine component or latex?   No   Have you ever had a serious reaction after receiving a vaccination?   No   Do you have a long-term health problem with heart, lung, kidney, or metabolic disease (e.g., diabetes), asthma, a blood disorder, no spleen, complement component deficiency, a cochlear implant, or a spinal fluid leak?  Are you on long-term aspirin therapy?   No   Do you have cancer, leukemia, HIV/AIDS, or any other immune system problem?   No   Do you have a parent, brother, or sister with an immune system problem?   No   In the past 3 months, have you taken medications that affect  your immune system, such as prednisone, other steroids, or anticancer drugs; drugs for the treatment of rheumatoid arthritis, Crohn s disease, or psoriasis; or have you had radiation treatments?   No   Have you had a seizure, or a brain or other nervous system problem?   No   During the past year, have you received a transfusion of blood or blood    products, or been given immune (gamma) globulin or antiviral drug?   No   For women: Are you pregnant or is there a chance you could become       pregnant during the next month?   No   Have you received any vaccinations in the past 4 weeks?   No     Immunization questionnaire answers were all negative.      Patient instructed to remain in clinic for 15 minutes afterwards, and to report any adverse reactions.     Screening performed by Winifred Lan CMA on 5/1/2025 at 12:01 PM.

## 2025-05-02 LAB
ANION GAP SERPL CALCULATED.3IONS-SCNC: 10 MMOL/L (ref 7–15)
BUN SERPL-MCNC: 13.1 MG/DL (ref 8–23)
CALCIUM SERPL-MCNC: 8.8 MG/DL (ref 8.8–10.4)
CHLORIDE SERPL-SCNC: 104 MMOL/L (ref 98–107)
CHOLEST SERPL-MCNC: 197 MG/DL
CREAT SERPL-MCNC: 0.67 MG/DL (ref 0.51–0.95)
EGFRCR SERPLBLD CKD-EPI 2021: >90 ML/MIN/1.73M2
FASTING STATUS PATIENT QL REPORTED: ABNORMAL
FASTING STATUS PATIENT QL REPORTED: NORMAL
FASTING STATUS PATIENT QL REPORTED: NORMAL
GLUCOSE SERPL-MCNC: 95 MG/DL (ref 70–99)
GLUCOSE SERPL-MCNC: 95 MG/DL (ref 70–99)
HCO3 SERPL-SCNC: 26 MMOL/L (ref 22–29)
HDLC SERPL-MCNC: 52 MG/DL
LDLC SERPL CALC-MCNC: 129 MG/DL
NONHDLC SERPL-MCNC: 145 MG/DL
POTASSIUM SERPL-SCNC: 4.1 MMOL/L (ref 3.4–5.3)
SODIUM SERPL-SCNC: 140 MMOL/L (ref 135–145)
TRIGL SERPL-MCNC: 81 MG/DL

## 2025-05-07 ENCOUNTER — OFFICE VISIT (OUTPATIENT)
Dept: FAMILY MEDICINE | Facility: CLINIC | Age: 75
End: 2025-05-07
Payer: COMMERCIAL

## 2025-05-07 VITALS
RESPIRATION RATE: 20 BRPM | HEIGHT: 56 IN | WEIGHT: 201 LBS | SYSTOLIC BLOOD PRESSURE: 107 MMHG | OXYGEN SATURATION: 95 % | HEART RATE: 86 BPM | DIASTOLIC BLOOD PRESSURE: 68 MMHG | BODY MASS INDEX: 45.21 KG/M2 | TEMPERATURE: 98.2 F

## 2025-05-07 DIAGNOSIS — I10 ESSENTIAL HYPERTENSION, BENIGN: Primary | ICD-10-CM

## 2025-05-07 RX ORDER — LISINOPRIL 10 MG/1
10 TABLET ORAL DAILY
Qty: 90 TABLET | Refills: 0 | Status: SHIPPED | OUTPATIENT
Start: 2025-05-07

## 2025-05-07 ASSESSMENT — PATIENT HEALTH QUESTIONNAIRE - PHQ9
SUM OF ALL RESPONSES TO PHQ QUESTIONS 1-9: 5
10. IF YOU CHECKED OFF ANY PROBLEMS, HOW DIFFICULT HAVE THESE PROBLEMS MADE IT FOR YOU TO DO YOUR WORK, TAKE CARE OF THINGS AT HOME, OR GET ALONG WITH OTHER PEOPLE: SOMEWHAT DIFFICULT
SUM OF ALL RESPONSES TO PHQ QUESTIONS 1-9: 5

## 2025-05-07 NOTE — PATIENT INSTRUCTIONS
1.) Stop taking Lisinopril-hydrochlorothiazide     2.)  lisinopril from the pharmacy and start taking 1 tablet (10mg) daily.     3.) Buy blood pressure cuff and record blood pressure every other day in the evening    Follow-up as scheduled.

## 2025-05-07 NOTE — PROGRESS NOTES
"  Assessment & Plan       Essential hypertension, benign  Had severe range pressures in office 1 week ago.  Started lisinopril-hydrochlorothiazide combo pill.  Switching to lisinopril today since patient has been feeling sweaty after taking the combo pill and her blood pressure is borderline low today.   - lisinopril (ZESTRIL) 10 MG tablet  Dispense: 90 tablet; Refill: 0  - Follow-up in 1 week      Subjective   Ma is a 74 year old, presenting for the following health issues:  Follow Up (BP) and Recheck Medication    Has been taking medicine once a day  Everything seems normal, occasionally gets sweaty but this is not associated with any other symptoms  Just noticed the sweatiness since starting the medication  ROS otherwise negative          5/7/2025    10:08 AM   Additional Questions   Roomed by Winifred   Accompanied by Self         5/7/2025    Information    services provided? Yes   Language Hmong   Type of interpretation provided Face-to-face    name Suvone    Agency [employer]           Objective    /68 (BP Location: Right arm)   Pulse 86   Temp 98.2  F (36.8  C) (Oral)   Resp 20   Ht 1.43 m (4' 8.3\")   Wt 91.2 kg (201 lb)   SpO2 95%   BMI 44.59 kg/m    Body mass index is 44.59 kg/m .  Physical Exam     GENERAL: healthy, alert and no distress  RESP: lungs clear on anterior exam, speaking in full sentences, normal work of breathing   CV: RRR, extremities appear well perfused  MS: no gross musculoskeletal defects noted  PSYCH: mentation appears normal, affect normal/bright             Signed Electronically by: Vinnie Herndon MD    "

## 2025-05-07 NOTE — PROGRESS NOTES
Preceptor Attestation:  Patient's case reviewed and discussed with Vinnie Herndon MD resident and I evaluated the patient. I agree with written assessment and plan of care.  Supervising Physician:  Miguel A Asif MD,  [facility]

## 2025-05-12 PROBLEM — E66.813 CLASS 3 OBESITY (H): Status: ACTIVE | Noted: 2025-05-12

## 2025-05-22 ENCOUNTER — TELEPHONE (OUTPATIENT)
Dept: FAMILY MEDICINE | Facility: CLINIC | Age: 75
End: 2025-05-22
Payer: COMMERCIAL

## 2025-05-22 NOTE — TELEPHONE ENCOUNTER
Forms/Letter Request    Type of form/letter: DME (wheelchair, hospital bed)    Type of DME requested:     QTY 1  Bath Seat with back 400lb    Do we have the form/letter: Yes: Prescription Request    Who is the form from? [facility]    Where did/will the form come from? form was faxed in    When is form/letter needed by: ASAP    How would you like the form/letter returned: Fax : [phone]    Patient Notified form requests are processed in 5-7 business days:No

## 2025-05-22 NOTE — TELEPHONE ENCOUNTER
Forms/Letter Request    Type of form/letter: DME (wheelchair, hospital bed)    Type of DME requested:     QTY 1  Walker Seat    Do we have the form/letter: Yes: Walker    Who is the form from? [facility]    Where did/will the form come from? form was faxed in    When is form/letter needed by: ASAP    How would you like the form/letter returned: Fax : [phone]    Patient Notified form requests are processed in 5-7 business days:No

## 2025-06-01 ENCOUNTER — TRANSFERRED RECORDS (OUTPATIENT)
Dept: HEALTH INFORMATION MANAGEMENT | Facility: CLINIC | Age: 75
End: 2025-06-01
Payer: COMMERCIAL

## 2025-06-05 ENCOUNTER — TELEPHONE (OUTPATIENT)
Dept: FAMILY MEDICINE | Facility: CLINIC | Age: 75
End: 2025-06-05
Payer: COMMERCIAL

## 2025-06-05 NOTE — TELEPHONE ENCOUNTER
Forms/Letter Request    Type of form/letter: DME     Type of DME requested: WALKER    Do we have the form/letter: Yes:     Who is the form from? [facility]      Where did/will the form come from? form was faxed in    When is form/letter needed by:     How would you like the form/letter returned: FAXED [phone]    Patient Notified form requests are processed in 5-7 business days:    Okay to leave a detailed message?:

## 2025-06-17 ENCOUNTER — OFFICE VISIT (OUTPATIENT)
Dept: FAMILY MEDICINE | Facility: CLINIC | Age: 75
End: 2025-06-17
Payer: COMMERCIAL

## 2025-06-17 VITALS
HEIGHT: 56 IN | WEIGHT: 196 LBS | DIASTOLIC BLOOD PRESSURE: 75 MMHG | RESPIRATION RATE: 18 BRPM | TEMPERATURE: 98.4 F | BODY MASS INDEX: 44.09 KG/M2 | OXYGEN SATURATION: 97 % | HEART RATE: 86 BPM | SYSTOLIC BLOOD PRESSURE: 134 MMHG

## 2025-06-17 DIAGNOSIS — I10 ESSENTIAL HYPERTENSION, BENIGN: Primary | ICD-10-CM

## 2025-06-17 DIAGNOSIS — R26.89 ABNORMALITY OF GAIT DUE TO IMPAIRMENT OF BALANCE: ICD-10-CM

## 2025-06-17 LAB
ANION GAP SERPL CALCULATED.3IONS-SCNC: 12 MMOL/L (ref 7–15)
BUN SERPL-MCNC: 17.7 MG/DL (ref 8–23)
CALCIUM SERPL-MCNC: 9 MG/DL (ref 8.8–10.4)
CHLORIDE SERPL-SCNC: 105 MMOL/L (ref 98–107)
CREAT SERPL-MCNC: 0.73 MG/DL (ref 0.51–0.95)
EGFRCR SERPLBLD CKD-EPI 2021: 85 ML/MIN/1.73M2
GLUCOSE SERPL-MCNC: 96 MG/DL (ref 70–99)
HCO3 SERPL-SCNC: 23 MMOL/L (ref 22–29)
POTASSIUM SERPL-SCNC: 4 MMOL/L (ref 3.4–5.3)
SODIUM SERPL-SCNC: 140 MMOL/L (ref 135–145)

## 2025-06-17 PROCEDURE — 3075F SYST BP GE 130 - 139MM HG: CPT

## 2025-06-17 PROCEDURE — 80048 BASIC METABOLIC PNL TOTAL CA: CPT

## 2025-06-17 PROCEDURE — 99213 OFFICE O/P EST LOW 20 MIN: CPT | Mod: GC

## 2025-06-17 PROCEDURE — 36415 COLL VENOUS BLD VENIPUNCTURE: CPT

## 2025-06-17 PROCEDURE — 3078F DIAST BP <80 MM HG: CPT

## 2025-06-17 RX ORDER — LISINOPRIL 10 MG/1
10 TABLET ORAL DAILY
Qty: 90 TABLET | Refills: 3 | Status: SHIPPED | OUTPATIENT
Start: 2025-06-17

## 2025-06-17 NOTE — PROGRESS NOTES
"I was present with the medical student who participated in the service and in the documentation of this note. I have verified the history and personally performed the physical exam and medical decision making, and have verified the content of the note, which accurately reflects my assessment of the patient and the plan of care.     - Vinnie Herndon MD       Assessment & Plan     (I10) Essential hypertension, benign  (primary encounter diagnosis)  Comment: BP was 134/75 in-office today. Her BP seems well controlled with the lisinopril with no side effects. Refilled lisinopril and checked BMP today.  Patient just received BP cuff and will start home BP monitoring.   Plan: Basic metabolic panel, Refilled lisinopril 10mg daily    (R26.89) Abnormality of gait due to impairment of balance  Comment: Pt states that she feels nervous about falling especially when she is stands up from sitting. Her  ordered her a walker.       Follow-up in 3 months for BP.     Subjective   Ma is a 75 year old, presenting for the following health issues:  Follow Up and Discuss DME order (walker)        6/17/2025    10:18 AM   Additional Questions   Roomed by Winifred   Accompanied by Self         6/17/2025    Information    services provided? Yes   Language Hmong   Type of interpretation provided Face-to-face    [facility]    Agency [employer]    She has been taking the Lisinopril everyday and hasn't had any side effects. She has not been taking her blood pressure at home. She just received her blood pressure cuff yesterday. She hasn't had any sweating episodes lately.      Review of Systems  Constitutional, HEENT, cardiovascular, pulmonary, gi and gu systems are negative, except as otherwise noted.      Objective    /75 (BP Location: Right arm)   Pulse 86   Temp 98.4  F (36.9  C) (Oral)   Resp 18   Ht 1.43 m (4' 8.3\")   Wt 88.9 kg (196 lb)   SpO2 97%   BMI 43.48 kg/m  "   Body mass index is 43.48 kg/m .  Physical Exam     GENERAL: healthy, alert and no distress  RESP: speaking in full sentences, normal work of breathing   CV: regular rate and rhythm, normal S1 S2, no S3 or S4, no murmur, click or rub, no peripheral edema  MS: no gross musculoskeletal defects noted  PSYCH: mentation appears normal, affect normal/bright         Nicolás Carney, MS3    Signed Electronically by: Vinnie Herndon MD

## 2025-06-17 NOTE — PROGRESS NOTES
Preceptor Attestation:  Patient's case reviewed and discussed with Vinnie Herndon MD resident and I evaluated the patient. I agree with written assessment and plan of care.  Supervising Physician:  MILANA STAHL MD  [facility]

## 2025-06-18 ENCOUNTER — MEDICAL CORRESPONDENCE (OUTPATIENT)
Dept: HEALTH INFORMATION MANAGEMENT | Facility: CLINIC | Age: 75
End: 2025-06-18
Payer: COMMERCIAL

## 2025-06-18 ENCOUNTER — RESULTS FOLLOW-UP (OUTPATIENT)
Dept: FAMILY MEDICINE | Facility: CLINIC | Age: 75
End: 2025-06-18
Payer: COMMERCIAL

## 2025-06-25 ENCOUNTER — TELEPHONE (OUTPATIENT)
Dept: FAMILY MEDICINE | Facility: CLINIC | Age: 75
End: 2025-06-25
Payer: COMMERCIAL

## 2025-06-25 NOTE — TELEPHONE ENCOUNTER
Forms/Letter Request    Type of form/letter: DME PRESCRIPTION REQUEST    Type of DME requested:    BATH SEAT W/ BACK 400LB CAP    Do we have the form/letter: Yes:     Who is the form from? [facility]     Where did/will the form come from? form was faxed in    When is form/letter needed by:     How would you like the form/letter returned: Fax : [phone]    Patient Notified form requests are processed in 5-7 business days:    Okay to leave a detailed message?:

## 2025-07-01 ENCOUNTER — MEDICAL CORRESPONDENCE (OUTPATIENT)
Dept: HEALTH INFORMATION MANAGEMENT | Facility: CLINIC | Age: 75
End: 2025-07-01
Payer: COMMERCIAL